# Patient Record
Sex: FEMALE | Race: WHITE | NOT HISPANIC OR LATINO | Employment: FULL TIME | URBAN - METROPOLITAN AREA
[De-identification: names, ages, dates, MRNs, and addresses within clinical notes are randomized per-mention and may not be internally consistent; named-entity substitution may affect disease eponyms.]

---

## 2017-09-05 ENCOUNTER — ALLSCRIPTS OFFICE VISIT (OUTPATIENT)
Dept: OTHER | Facility: OTHER | Age: 37
End: 2017-09-05

## 2018-01-13 VITALS
WEIGHT: 184 LBS | BODY MASS INDEX: 28.88 KG/M2 | DIASTOLIC BLOOD PRESSURE: 94 MMHG | HEIGHT: 67 IN | SYSTOLIC BLOOD PRESSURE: 150 MMHG

## 2018-04-24 DIAGNOSIS — B00.9 HERPES: Primary | ICD-10-CM

## 2018-04-24 RX ORDER — ACYCLOVIR 400 MG/1
TABLET ORAL
Qty: 30 TABLET | Refills: 3 | Status: SHIPPED | OUTPATIENT
Start: 2018-04-24 | End: 2018-09-12

## 2018-09-12 ENCOUNTER — ANNUAL EXAM (OUTPATIENT)
Dept: OBGYN CLINIC | Facility: CLINIC | Age: 38
End: 2018-09-12
Payer: COMMERCIAL

## 2018-09-12 VITALS
HEIGHT: 67 IN | DIASTOLIC BLOOD PRESSURE: 84 MMHG | WEIGHT: 190.4 LBS | SYSTOLIC BLOOD PRESSURE: 122 MMHG | BODY MASS INDEX: 29.88 KG/M2

## 2018-09-12 DIAGNOSIS — Z01.419 WOMEN'S ANNUAL ROUTINE GYNECOLOGICAL EXAMINATION: Primary | ICD-10-CM

## 2018-09-12 PROCEDURE — S0612 ANNUAL GYNECOLOGICAL EXAMINA: HCPCS | Performed by: OBSTETRICS & GYNECOLOGY

## 2018-09-12 RX ORDER — ALBUTEROL SULFATE 90 UG/1
2 AEROSOL, METERED RESPIRATORY (INHALATION) EVERY 6 HOURS PRN
COMMUNITY

## 2018-09-12 NOTE — PROGRESS NOTES
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  This responds well to Valtrex  There are no new major family illnesses report this time  Patient denies any problem with anxiety and stress  Sleeping is a not a problem  She has a dentist on a regular basis  She is exercising trying to lose more weight  Review of systems negative except for recurrent cold sores      Medical issues consist of the of the cold sores in asthma      Medication include Valtrex in Proventil  Her surgical history is negative        Family history is positive for hypertension, cataracts, lung cancer, heart attack, heart disease and stroke      Physical exam    This is a well-developed well-nourished white female acute distress, her HEENT is was within normal limits, cardiac exam shows a regular rhythm and rate, there is no murmur normal S1-S2  Lungs are clear to auscultation  Breast exam symmetrical nontender no masses no lumps axilla clear bilaterally  Abdomen is soft nontender no masses positive bowel sound no rebound or guarding  Pelvic exam the external genitalia normal limits the vagina is clean the cervix is nulliparous to uterus is anterior normal size there is no cervical motion tenderness the adnexa clear bilaterally  A Pap smear was not performed because of prior history being HPV negative in the year 2016  Currently she is not using any method of contraception because her  has erectile dysfunction  If there is any sexual activity there were use condoms    She will keep me informed of her progress return to office in 1 year pending new problems